# Patient Record
Sex: FEMALE | Race: BLACK OR AFRICAN AMERICAN | NOT HISPANIC OR LATINO | URBAN - METROPOLITAN AREA
[De-identification: names, ages, dates, MRNs, and addresses within clinical notes are randomized per-mention and may not be internally consistent; named-entity substitution may affect disease eponyms.]

---

## 2020-08-28 ENCOUNTER — OFFICE (OUTPATIENT)
Dept: URBAN - METROPOLITAN AREA CLINIC 79 | Facility: CLINIC | Age: 25
End: 2020-08-28

## 2020-08-28 VITALS
HEIGHT: 61 IN | HEART RATE: 75 BPM | TEMPERATURE: 97.2 F | WEIGHT: 194 LBS | SYSTOLIC BLOOD PRESSURE: 103 MMHG | DIASTOLIC BLOOD PRESSURE: 60 MMHG

## 2020-08-28 DIAGNOSIS — K21.9 GASTRO-ESOPHAGEAL REFLUX DISEASE WITHOUT ESOPHAGITIS: ICD-10-CM

## 2020-08-28 DIAGNOSIS — R11.10 VOMITING, UNSPECIFIED: ICD-10-CM

## 2020-08-28 PROCEDURE — 99204 OFFICE O/P NEW MOD 45 MIN: CPT | Performed by: PHYSICIAN ASSISTANT

## 2020-08-28 RX ORDER — OMEPRAZOLE 40 MG/1
CAPSULE, DELAYED RELEASE ORAL
Qty: 30 | Refills: 1 | Status: ACTIVE
Start: 2020-08-28

## 2020-08-28 NOTE — SERVICEHPINOTES
23 yo female presents with GERD and vomiting. She notes diagnosis of GERD since age 11. She took Zantac at one point but hasn't been taking anything for some time. Can have reflux symptoms intermittently. Over the past few months she has had episodes of nausea and vomiting. These episodes tend to happen at night when she is lying down. Can wake her in the middle of the night with nausea and then she won't feel better until she throws up - tends to have bile and dinner foods come up. Episodes are happening occasionally, possibly worse with intake of fried foods. Can get a lot of bloating at night as well, and has to belch to feel better. Can have diarrhea at times when having more UGI sx. She has occasional lower abdominal pains no RUQ pain. No black stools. She currently is not taking anything for her symptoms.

## 2020-10-05 ENCOUNTER — OFFICE (OUTPATIENT)
Dept: URBAN - METROPOLITAN AREA CLINIC 34 | Facility: CLINIC | Age: 25
End: 2020-10-05
Payer: COMMERCIAL

## 2020-10-05 DIAGNOSIS — Z11.59 ENCOUNTER FOR SCREENING FOR OTHER VIRAL DISEASES: ICD-10-CM

## 2020-10-05 PROCEDURE — 99211 OFF/OP EST MAY X REQ PHY/QHP: CPT | Mod: CS,25 | Performed by: INTERNAL MEDICINE

## 2020-10-05 PROCEDURE — 99211 OFF/OP EST MAY X REQ PHY/QHP: CPT | Mod: 25,CS | Performed by: INTERNAL MEDICINE

## 2020-10-07 ENCOUNTER — OFFICE (OUTPATIENT)
Dept: URBAN - METROPOLITAN AREA CLINIC 30 | Facility: CLINIC | Age: 25
End: 2020-10-07

## 2020-10-07 VITALS
OXYGEN SATURATION: 95 % | SYSTOLIC BLOOD PRESSURE: 145 MMHG | HEIGHT: 61 IN | DIASTOLIC BLOOD PRESSURE: 83 MMHG | DIASTOLIC BLOOD PRESSURE: 33 MMHG | HEART RATE: 80 BPM | RESPIRATION RATE: 17 BRPM | HEART RATE: 109 BPM | HEART RATE: 109 BPM | TEMPERATURE: 97.7 F | RESPIRATION RATE: 17 BRPM | WEIGHT: 194 LBS | RESPIRATION RATE: 18 BRPM | OXYGEN SATURATION: 97 % | HEIGHT: 61 IN | OXYGEN SATURATION: 100 % | TEMPERATURE: 97.7 F | OXYGEN SATURATION: 97 % | TEMPERATURE: 98.1 F | DIASTOLIC BLOOD PRESSURE: 33 MMHG | DIASTOLIC BLOOD PRESSURE: 83 MMHG | SYSTOLIC BLOOD PRESSURE: 128 MMHG | WEIGHT: 194 LBS | DIASTOLIC BLOOD PRESSURE: 81 MMHG | HEART RATE: 110 BPM | SYSTOLIC BLOOD PRESSURE: 145 MMHG | SYSTOLIC BLOOD PRESSURE: 139 MMHG | OXYGEN SATURATION: 100 % | SYSTOLIC BLOOD PRESSURE: 128 MMHG | OXYGEN SATURATION: 95 % | SYSTOLIC BLOOD PRESSURE: 139 MMHG | HEART RATE: 110 BPM | DIASTOLIC BLOOD PRESSURE: 81 MMHG | TEMPERATURE: 98.1 F | HEART RATE: 80 BPM | RESPIRATION RATE: 18 BRPM

## 2020-10-07 DIAGNOSIS — K29.60 OTHER GASTRITIS WITHOUT BLEEDING: ICD-10-CM

## 2020-10-07 DIAGNOSIS — R11.10 VOMITING, UNSPECIFIED: ICD-10-CM

## 2020-10-07 DIAGNOSIS — K22.8 OTHER SPECIFIED DISEASES OF ESOPHAGUS: ICD-10-CM

## 2020-10-07 DIAGNOSIS — K21.9 GASTRO-ESOPHAGEAL REFLUX DISEASE WITHOUT ESOPHAGITIS: ICD-10-CM

## 2020-10-07 LAB
GI UPPER EGD HISOLOGY - SPECM 1 JAR(S): 2: (no result)
GI UPPER EGD HISOLOGY - SPECM 1 JAR(S): 2: (no result)
GI UPPER EGD HISOLOGY - SPECM 1 JAR(S): 2: PDF REPORT: (no result)
GI UPPER EGD HISOLOGY - SPECM 1 JAR(S): 2: PDF REPORT: (no result)

## 2020-10-07 PROCEDURE — 00731 ANES UPR GI NDSC PX NOS: CPT | Mod: AA,P3

## 2020-10-07 PROCEDURE — 00731 ANES UPR GI NDSC PX NOS: CPT | Mod: P3,AA
